# Patient Record
Sex: FEMALE | Race: WHITE | Employment: FULL TIME | URBAN - METROPOLITAN AREA
[De-identification: names, ages, dates, MRNs, and addresses within clinical notes are randomized per-mention and may not be internally consistent; named-entity substitution may affect disease eponyms.]

---

## 2020-01-23 ENCOUNTER — TRANSFERRED RECORDS (OUTPATIENT)
Dept: HEALTH INFORMATION MANAGEMENT | Facility: CLINIC | Age: 50
End: 2020-01-23

## 2020-02-13 ENCOUNTER — TRANSFERRED RECORDS (OUTPATIENT)
Dept: HEALTH INFORMATION MANAGEMENT | Facility: CLINIC | Age: 50
End: 2020-02-13

## 2020-03-16 ENCOUNTER — TRANSFERRED RECORDS (OUTPATIENT)
Dept: HEALTH INFORMATION MANAGEMENT | Facility: CLINIC | Age: 50
End: 2020-03-16

## 2020-11-13 ENCOUNTER — TRANSFERRED RECORDS (OUTPATIENT)
Dept: HEALTH INFORMATION MANAGEMENT | Facility: CLINIC | Age: 50
End: 2020-11-13

## 2020-11-13 ENCOUNTER — MEDICAL CORRESPONDENCE (OUTPATIENT)
Dept: HEALTH INFORMATION MANAGEMENT | Facility: CLINIC | Age: 50
End: 2020-11-13

## 2020-11-20 ENCOUNTER — TRANSFERRED RECORDS (OUTPATIENT)
Dept: HEALTH INFORMATION MANAGEMENT | Facility: CLINIC | Age: 50
End: 2020-11-20

## 2020-11-23 ENCOUNTER — TRANSCRIBE ORDERS (OUTPATIENT)
Dept: OTHER | Age: 50
End: 2020-11-23

## 2020-11-23 DIAGNOSIS — M25.531 RIGHT WRIST PAIN: Primary | ICD-10-CM

## 2020-12-02 ENCOUNTER — OFFICE VISIT (OUTPATIENT)
Dept: ORTHOPEDICS | Facility: CLINIC | Age: 50
End: 2020-12-02
Payer: OTHER MISCELLANEOUS

## 2020-12-02 ENCOUNTER — ANCILLARY PROCEDURE (OUTPATIENT)
Dept: GENERAL RADIOLOGY | Facility: CLINIC | Age: 50
End: 2020-12-02
Attending: STUDENT IN AN ORGANIZED HEALTH CARE EDUCATION/TRAINING PROGRAM
Payer: OTHER MISCELLANEOUS

## 2020-12-02 VITALS
BODY MASS INDEX: 48.82 KG/M2 | SYSTOLIC BLOOD PRESSURE: 146 MMHG | HEIGHT: 65 IN | WEIGHT: 293 LBS | DIASTOLIC BLOOD PRESSURE: 86 MMHG

## 2020-12-02 DIAGNOSIS — M25.531 RIGHT WRIST PAIN: ICD-10-CM

## 2020-12-02 DIAGNOSIS — S63.591A: Primary | ICD-10-CM

## 2020-12-02 PROCEDURE — 99204 OFFICE O/P NEW MOD 45 MIN: CPT | Performed by: STUDENT IN AN ORGANIZED HEALTH CARE EDUCATION/TRAINING PROGRAM

## 2020-12-02 PROCEDURE — 73130 X-RAY EXAM OF HAND: CPT | Mod: RT | Performed by: RADIOLOGY

## 2020-12-02 PROCEDURE — 73110 X-RAY EXAM OF WRIST: CPT | Mod: RT | Performed by: RADIOLOGY

## 2020-12-02 RX ORDER — HYDROCHLOROTHIAZIDE 12.5 MG/1
CAPSULE ORAL DAILY
COMMUNITY

## 2020-12-02 RX ORDER — PRAVASTATIN SODIUM 10 MG
10 TABLET ORAL DAILY
COMMUNITY

## 2020-12-02 RX ORDER — GLIPIZIDE 10 MG/1
10 TABLET ORAL
COMMUNITY

## 2020-12-02 ASSESSMENT — MIFFLIN-ST. JEOR: SCORE: 2048.78

## 2020-12-02 NOTE — LETTER
12/2/2020         RE: Ana Goode  [address] [place] [id number]        Dear Colleague,    Thank you for referring your patient, Ana Goode, to the [facility] [place]. Please see a copy of my visit note below.    Hand Surgery History & Physical    REFERRING PHYSICIAN: Franklin Gay   PRIMARY CARE PHYSICIAN: Franklin Gay           Chief Complaint:   Pain of the Right Wrist      History of Present Illness:  Symptom Profile Including: location of symptoms, onset, severity, exacerbating/alleviating factors, previous treatments:        Ana Goode is a 50 year old female who presents for evaluation of right wrist pain.   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    Numbness/tingling: This is localized to the wrist. It began 2 years ago and she relates it to an injury in 2018 when she was putting 20 pounds of ground beef through a floor mixer and rotating a lever/crank when the lever jammed and caused a significant pop in her right wrist.  The pain is worsened with increased use such as mixing. It is worsened by gripping. It is improved by ice and vibration (for ex when she uses the hand mixer).  It is also worsened with lifting. It wakes the patient from sleep.  Prior treatments included brace and massager these seem to help minimally.    Occasional popping and clicking localized to the dorsal central and ulnar aspects of the wrist.           Past Medical History:     Past Medical History:   Diagnosis Date     Diabetes mellitus (H)             Past Surgical History:     Past Surgical History:   Procedure Laterality Date     HYSTERECTOMY              Social History:     Social History     Tobacco Use     Smoking status: Never Smoker     Smokeless tobacco: Never Used   Substance Use Topics     Alcohol use: Not on file            Family History:     Family History   Problem Relation Age of Onset     Diabetes Maternal Grandmother      Heart Disease Maternal  "Grandmother      Diabetes Paternal Grandmother             Allergies:     Allergies   Allergen Reactions     Codeine             Medications:     Current Outpatient Medications   Medication     glipiZIDE (GLUCOTROL) 10 MG tablet     hydrochlorothiazide (MICROZIDE) 12.5 MG capsule     metFORMIN (GLUCOPHAGE) 1000 MG tablet     pravastatin (PRAVACHOL) 10 MG tablet     No current facility-administered medications for this visit.              Review of Systems:     A 10 point ROS was performed and reviewed. Specific responses to these questions are noted at the end of the document.         Physical Exam:   Vitals: Ht 1.651 m (5' 5\")   Wt 143.8 kg (317 lb)   BMI 52.75 kg/m      Physical Exam Adult:  General: Well-nourished, alert, cooperative with exam and in no acute distress  HEENT: Atraumatic, normocephalic, extraocular movements intact, moist mucous membranes, trachea midline  Pulmonary: Unlabored work of breathing, on room air  Cardiovascular: Warm and well-perfused extremities. 2+ radial pulses  Skin: Warm, intact without rashes to the upper extremities, head or neck   Gait: Normal  Neuro/psych: Oriented to time, place and person. Affect is appropriate    Musculoskeletal: A focused physical examination of the right wrist reveals:   Inspection-there is mild increased prominence of the ulnar head on the right when compared to the left.  Palpation- there is tenderness to palpation over the dorsal aspect of the wrist centrally and over the ulnar head.  There is tenderness at the radiocarpal joint but there is much more tenderness over the distal radial ulnar joint.  Nontender to palpation over the fovea.  Negative ulnar ballottement.  No pain with passive ulnar deviation of the wrist.  Neurovascular- intact light touch sensation and motor to distribution of the median, ulnar and radial nerves. Brisk capillary refill to the distal fingers. 2+ radial pulse.   Range of Motion: Full and symmetric wrist flexion and " extension and pronation and supination.  However repetitive pronation and supination causes discomfort in the wrist.  Able to make a full fist.  Stability: no dislocation, subluxation or laxity.  With the forearm supinated to 90 degrees the distal radial ulnar joint is stable when tested by shucking the ulnar volar and dorsal.  With the forearm pronated to 90 degrees there is increased laxity in the DRUJ compared to the contralateral side.  In addition there is very slight crepitus.  With the forearm fully supinated and resisted forearm flexion this results in pain at the DRUJ.  Muscle strength and tone: No atrophy, spasticity or flaccidness. 5/5 strength EPL, FPL, APB, first dorsal interosseous.    Special Tests:    ECU Synergy test is negative and does not reproduce dorsal or ulnar wrist pain.    Median nerve  Thenar atrophy: None  Carpal tunnel compression test: Negative  Phalen's test: Negative  Tinel's test: Negative    Ulnar nerve  First dorsal interosseous atrophy: None    Tendon: FDS and FDP intact to all fingers. Normal tenodesis effect.     CMC grind: No pain                      Imaging:   3 view X-ray of the right hand and wrist was independently interpreted by me. The results were discussed with the patient.  Findings include:    Hand x-ray: Eaton stage II thumb CMC arthritis with joint space narrowing, subchondral sclerosis and osteophytes less than 2 mm.  Mild joint subluxation.  There is also mild STT arthritis.     Wrist x-ray: 4 mm negative ulnar variance.  Normal radiocarpal joint without evidence of degenerative change.  Mild unusual appearance of the distal radial ulnar joint with slight joint incongruity but no significant joint space narrowing, osteophytes, or subchondral sclerosis to suggest degenerative change.                 Assessment and Plan:   Assessment:  50 year old female with chronic right wrist pain exacerbated by lifting and repetitive pronation and supination, related to an  axial load type of injury 2 years ago.  Signs, symptoms, clinical exam, though not completely clear are suggestive of distal radial ulnar joint instability.    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     Plan:  1. I discussed the patient's diagnosis with her and reviewed all of her imaging and the plan going forward.  2. I recommend obtaining a right wrist MRI to evaluate for incompetence of the DRUJ ligaments.  Specifically I would like the exam to be obtained in pronation to look for any incongruity at the distal radial ulnar joint as well as any ligamentous disruption.    3. The patient will follow up after the MRI is complete to review the results and discuss moving forward.      Mlea Chavez MD  Department of Orthopedic Surgery  Hand Surgery              Again, thank you for allowing me to participate in the care of your patient.        Sincerely,        Mela Chavez MD

## 2020-12-02 NOTE — PATIENT INSTRUCTIONS
1. Right wrist pain      The [facility] team will call you to schedule your imaging exam in the next 1-2 days. You can also call them directly at [facility] [phone] ([address], [address], [place], [place]) to schedule your appointment.     Follow up with Dr. Chavez 1-2 days after MRI    Call my office with any questions or concerns, [phone].

## 2020-12-02 NOTE — PROGRESS NOTES
Hand Surgery History & Physical    REFERRING PHYSICIAN: Franklin Gay   PRIMARY CARE PHYSICIAN: Franklin Gay           Chief Complaint:   Pain of the Right Wrist      History of Present Illness:  Symptom Profile Including: location of symptoms, onset, severity, exacerbating/alleviating factors, previous treatments:        Ana Goode is a 50 year old female who presents for evaluation of right wrist pain.   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    Numbness/tingling: This is localized to the wrist. It began 2 years ago and she relates it to an injury in 2018 when she was putting 20 pounds of ground beef through a floor mixer and rotating a lever/crank when the lever jammed and caused a significant pop in her right wrist.  The pain is worsened with increased use such as mixing. It is worsened by gripping. It is improved by ice and vibration (for ex when she uses the hand mixer).  It is also worsened with lifting. It wakes the patient from sleep.  Prior treatments included brace and massager these seem to help minimally.    Occasional popping and clicking localized to the dorsal central and ulnar aspects of the wrist.           Past Medical History:     Past Medical History:   Diagnosis Date     Diabetes mellitus (H)             Past Surgical History:     Past Surgical History:   Procedure Laterality Date     HYSTERECTOMY              Social History:     Social History     Tobacco Use     Smoking status: Never Smoker     Smokeless tobacco: Never Used   Substance Use Topics     Alcohol use: Not on file            Family History:     Family History   Problem Relation Age of Onset     Diabetes Maternal Grandmother      Heart Disease Maternal Grandmother      Diabetes Paternal Grandmother             Allergies:     Allergies   Allergen Reactions     Codeine             Medications:     Current Outpatient Medications   Medication     glipiZIDE (GLUCOTROL) 10 MG tablet     hydrochlorothiazide (MICROZIDE)  "12.5 MG capsule     metFORMIN (GLUCOPHAGE) 1000 MG tablet     pravastatin (PRAVACHOL) 10 MG tablet     No current facility-administered medications for this visit.              Review of Systems:     A 10 point ROS was performed and reviewed. Specific responses to these questions are noted at the end of the document.         Physical Exam:   Vitals: Ht 1.651 m (5' 5\")   Wt 143.8 kg (317 lb)   BMI 52.75 kg/m      Physical Exam Adult:  General: Well-nourished, alert, cooperative with exam and in no acute distress  HEENT: Atraumatic, normocephalic, extraocular movements intact, moist mucous membranes, trachea midline  Pulmonary: Unlabored work of breathing, on room air  Cardiovascular: Warm and well-perfused extremities. 2+ radial pulses  Skin: Warm, intact without rashes to the upper extremities, head or neck   Gait: Normal  Neuro/psych: Oriented to time, place and person. Affect is appropriate    Musculoskeletal: A focused physical examination of the right wrist reveals:   Inspection-there is mild increased prominence of the ulnar head on the right when compared to the left.  Palpation- there is tenderness to palpation over the dorsal aspect of the wrist centrally and over the ulnar head.  There is tenderness at the radiocarpal joint but there is much more tenderness over the distal radial ulnar joint.  Nontender to palpation over the fovea.  Negative ulnar ballottement.  No pain with passive ulnar deviation of the wrist.  Neurovascular- intact light touch sensation and motor to distribution of the median, ulnar and radial nerves. Brisk capillary refill to the distal fingers. 2+ radial pulse.   Range of Motion: Full and symmetric wrist flexion and extension and pronation and supination.  However repetitive pronation and supination causes discomfort in the wrist.  Able to make a full fist.  Stability: no dislocation, subluxation or laxity.  With the forearm supinated to 90 degrees the distal radial ulnar joint is " stable when tested by shucking the ulnar volar and dorsal.  With the forearm pronated to 90 degrees there is increased laxity in the DRUJ compared to the contralateral side.  In addition there is very slight crepitus.  With the forearm fully supinated and resisted forearm flexion this results in pain at the DRUJ.  Muscle strength and tone: No atrophy, spasticity or flaccidness. 5/5 strength EPL, FPL, APB, first dorsal interosseous.    Special Tests:    ECU Synergy test is negative and does not reproduce dorsal or ulnar wrist pain.    Median nerve  Thenar atrophy: None  Carpal tunnel compression test: Negative  Phalen's test: Negative  Tinel's test: Negative    Ulnar nerve  First dorsal interosseous atrophy: None    Tendon: FDS and FDP intact to all fingers. Normal tenodesis effect.     CMC grind: No pain                      Imaging:   3 view X-ray of the right hand and wrist was independently interpreted by me. The results were discussed with the patient.  Findings include:    Hand x-ray: Eaton stage II thumb CMC arthritis with joint space narrowing, subchondral sclerosis and osteophytes less than 2 mm.  Mild joint subluxation.  There is also mild STT arthritis.     Wrist x-ray: 4 mm negative ulnar variance.  Normal radiocarpal joint without evidence of degenerative change.  Mild unusual appearance of the distal radial ulnar joint with slight joint incongruity but no significant joint space narrowing, osteophytes, or subchondral sclerosis to suggest degenerative change.                 Assessment and Plan:   Assessment:  50 year old female with chronic right wrist pain exacerbated by lifting and repetitive pronation and supination, related to an axial load type of injury 2 years ago.  Signs, symptoms, clinical exam, though not completely clear are suggestive of distal radial ulnar joint instability.    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     Plan:  1. I  discussed the patient's diagnosis with her and reviewed all of her imaging and the plan going forward.  2. I recommend obtaining a right wrist MRI to evaluate for incompetence of the DRUJ ligaments.  Specifically I would like the exam to be obtained in pronation to look for any incongruity at the distal radial ulnar joint as well as any ligamentous disruption.    3. The patient will follow up after the MRI is complete to review the results and discuss moving forward.      Mela Chavez MD  Department of Orthopedic Surgery  Hand Surgery

## 2020-12-10 ENCOUNTER — HOSPITAL ENCOUNTER (OUTPATIENT)
Dept: MRI IMAGING | Facility: CLINIC | Age: 50
Discharge: HOME | End: 2020-12-10
Attending: STUDENT IN AN ORGANIZED HEALTH CARE EDUCATION/TRAINING PROGRAM | Admitting: STUDENT IN AN ORGANIZED HEALTH CARE EDUCATION/TRAINING PROGRAM
Payer: OTHER MISCELLANEOUS

## 2020-12-10 DIAGNOSIS — M25.531 RIGHT WRIST PAIN: ICD-10-CM

## 2020-12-10 PROCEDURE — 73221 MRI JOINT UPR EXTREM W/O DYE: CPT | Mod: RT

## 2020-12-10 PROCEDURE — 73221 MRI JOINT UPR EXTREM W/O DYE: CPT | Mod: 26 | Performed by: RADIOLOGY

## 2020-12-16 ENCOUNTER — TELEPHONE (OUTPATIENT)
Dept: ORTHOPEDICS | Facility: CLINIC | Age: 50
End: 2020-12-16

## 2020-12-16 NOTE — TELEPHONE ENCOUNTER
Called and got the patient on the schedule. She is coming from [place] and will call if she needs to change the appointment for any reason.    Connie Shaikh ATC

## 2020-12-16 NOTE — TELEPHONE ENCOUNTER
Message from Ana, returning call regarding MRI results and making follow up appointment.  Please call back.

## 2020-12-30 ENCOUNTER — OFFICE VISIT (OUTPATIENT)
Dept: ORTHOPEDICS | Facility: CLINIC | Age: 50
End: 2020-12-30
Payer: OTHER MISCELLANEOUS

## 2020-12-30 VITALS — BODY MASS INDEX: 48.82 KG/M2 | WEIGHT: 293 LBS | HEIGHT: 65 IN

## 2020-12-30 DIAGNOSIS — M25.531 RIGHT WRIST PAIN: Primary | ICD-10-CM

## 2020-12-30 PROCEDURE — 20605 DRAIN/INJ JOINT/BURSA W/O US: CPT | Mod: RT | Performed by: STUDENT IN AN ORGANIZED HEALTH CARE EDUCATION/TRAINING PROGRAM

## 2020-12-30 PROCEDURE — 99213 OFFICE O/P EST LOW 20 MIN: CPT | Mod: 25 | Performed by: STUDENT IN AN ORGANIZED HEALTH CARE EDUCATION/TRAINING PROGRAM

## 2020-12-30 RX ADMIN — LIDOCAINE HYDROCHLORIDE 1 ML: 10 INJECTION, SOLUTION INFILTRATION; PERINEURAL at 14:49

## 2020-12-30 RX ADMIN — TESTOSTERONE CYPIONATE 1 ML: 200 INJECTION INTRAMUSCULAR at 14:49

## 2020-12-30 ASSESSMENT — MIFFLIN-ST. JEOR: SCORE: 2048.78

## 2020-12-30 NOTE — PROGRESS NOTES
Hand Surgery History & Physical    REFERRING PHYSICIAN: Franklin Gay   PRIMARY CARE PHYSICIAN: Franklin Gay           Chief Complaint:   Follow Up of the Right Wrist      History of Present Illness:  Symptom Profile Including: location of symptoms, onset, severity, exacerbating/alleviating factors, previous treatments:        Ana Goode is a 50 year old female who presents for evaluation of right wrist pain.   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     Following up today to go over MRI results. States that her symptoms are getting a little worse but did say she she just finished 9 straight days of work.  Briefly, her right wrist pain seems to originate after a jamming type of injury when using a lever a few years ago.  She continues to have pain in the dorsal aspect of the wrist near the DRUJ.         Past Medical History:     Past Medical History:   Diagnosis Date     Diabetes mellitus (H)             Past Surgical History:     Past Surgical History:   Procedure Laterality Date     HYSTERECTOMY              Social History:     Social History     Tobacco Use     Smoking status: Never Smoker     Smokeless tobacco: Never Used   Substance Use Topics     Alcohol use: Not on file            Family History:     Family History   Problem Relation Age of Onset     Diabetes Maternal Grandmother      Heart Disease Maternal Grandmother      Diabetes Paternal Grandmother             Allergies:     Allergies   Allergen Reactions     Codeine             Medications:     Current Outpatient Medications   Medication     glipiZIDE (GLUCOTROL) 10 MG tablet     hydrochlorothiazide (MICROZIDE) 12.5 MG capsule     metFORMIN (GLUCOPHAGE) 1000 MG tablet     pravastatin (PRAVACHOL) 10 MG tablet     No current facility-administered medications for this visit.              Review of Systems:     A 10 point ROS was performed and reviewed. Specific responses to these questions are noted at the end of the document.          "Physical Exam:   Vitals: Ht 1.651 m (5' 5\")   Wt 143.8 kg (317 lb)   BMI 52.75 kg/m      Physical Exam Adult:  General: Well-nourished, alert, cooperative with exam and in no acute distress  HEENT: Atraumatic, normocephalic, extraocular movements intact, moist mucous membranes, trachea midline  Pulmonary: Unlabored work of breathing, on room air  Cardiovascular: Warm and well-perfused extremities. 2+ radial pulses  Skin: Warm, intact without rashes to the upper extremities, head or neck   Gait: Normal  Neuro/psych: Oriented to time, place and person. Affect is appropriate    Musculoskeletal: A focused physical examination of the right wrist reveals:   Inspection-there is mild increased prominence of the ulnar head on the right when compared to the left.  Palpation- there is tenderness to palpation over the dorsal aspect of the wrist centrally and over the ulnar head.  There is tenderness at the radiocarpal joint but there is much more tenderness over the distal radial ulnar joint.  Nontender to palpation over the fovea.  Negative ulnar ballottement.  No pain with passive ulnar deviation of the wrist.  Neurovascular- intact light touch sensation and motor to distribution of the median, ulnar and radial nerves. Brisk capillary refill to the distal fingers. 2+ radial pulse.   Range of Motion: Full and symmetric wrist flexion and extension and pronation and supination.  However repetitive pronation and supination causes discomfort in the wrist.  Able to make a full fist.  Stability: no dislocation, subluxation or laxity.  There is crepitus in the DRUJ.  With the forearm fully supinated and resisted forearm flexion this results in pain at the DRUJ.  Muscle strength and tone: No atrophy, spasticity or flaccidness. 5/5 strength EPL, FPL, APB, first dorsal interosseous.    Special Tests:    ECU Synergy test is negative and does not reproduce dorsal or ulnar wrist pain.    Median nerve  Thenar atrophy: None  Carpal tunnel " compression test: Negative  Phalen's test: Negative  Tinel's test: Negative    Ulnar nerve  First dorsal interosseous atrophy: None    Tendon: FDS and FDP intact to all fingers. Normal tenodesis effect.     CMC grind: No pain                      Imaging:   Right wrist MRI report is reviewed and images are also independently interpreted.    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  The volar and dorsal ligaments of the DRUJ are intact.  There is no evidence of ulnar subluxation.             Assessment and Plan:   Assessment:  50 year old female with chronic right wrist pain exacerbated by lifting and repetitive pronation and supination, related to an axial load type of injury 2 years ago.  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     Plan:  1. I discussed the patient's MRI with her and counseled her that my next recommendation would be attempting to perform a corticosteroid injection in the right, distal radial ulnar joint.  This was administered in clinic today.  See procedure note below.  2. I have also ordered inflammatory lab as work-up for inflammatory arthropathy secondary to rheumatoid arthritis, lupus, gout.  3. I will call the patient regarding her lab results after they are complete.  At that time I will also see how she is feeling and if the steroid injection was beneficial to her.    4. Procedure: Right, distal radial ulnar joint injection.  The right DRUJ was palpated just proximal to the radiocarpal joint.  I palpated the point of maximum tenderness.  I used a ChloraPrep solution to prep the wrist.  I inserted a 27-gauge needle down to the bone tendon to directed into the DRUJ.  I injected 1 cc of dexamethasone combined with 1 cc of 1% lidocaine.  I withdrew the needle and applied a  sterile Band-Aid.  Patient tolerated procedure without any immediate complication.      Mela Chavez MD  Department of Orthopedic Surgery  Hand Surgery    Medium Joint Injection/Arthrocentesis    Date/Time: 12/30/2020 2:49 PM  Performed by: Mela Chavez MD  Authorized by: Mela Chavez MD     Needle Size comment:  27 g  Location:  Wrist  Medications:  1 mL lidocaine 1 %; 1 mL dexamethasone 120 MG/30ML  Outcome:  Tolerated well, no immediate complications  Procedure discussed: discussed risks, benefits, and alternatives    Timeout: timeout called immediately prior to procedure    Prep: patient was prepped and draped in usual sterile fashion

## 2020-12-30 NOTE — PATIENT INSTRUCTIONS
1. Right wrist pain        Lab orders have been placed. Please call and make an appointment at your earliest convenience. You can do this at any of the [facility] that have a lab on site. If you would like to have them done at the [facility] you can call [phone].    Steroid injection of the right wrist was performed today in clinic    - Would not soak in a hot tub, bath or swimming pool for 48 hours  - Ok to shower  - Ice today and only do your normal amounts of activity  - The lidocaine (what is giving you pain relief right now) will likely stop working in 1-2 hours.  You will then have pain again, similar to before you received the injection. The corticosteroid will not start working until approximately 1-2 weeks from now.  In a small percentage of people, cortisone can cause flushing/redness in the face. This usually lasts for 1-3 days and resolves. Cool compress and Ibuprofen/Tylenol can help if this happens.         Follow up with Dr. Chavez in 4 weeks    Call my office with any questions or concerns, [phone].

## 2020-12-30 NOTE — LETTER
12/30/2020         RE: Ana Goode  [address] [place] [id number]        Dear Colleague,    Thank you for referring your patient, Ana Goode, to the [facility] [place]. Please see a copy of my visit note below.    Hand Surgery History & Physical    REFERRING PHYSICIAN: Franklin Gay   PRIMARY CARE PHYSICIAN: Franklin Gay           Chief Complaint:   Follow Up of the Right Wrist      History of Present Illness:  Symptom Profile Including: location of symptoms, onset, severity, exacerbating/alleviating factors, previous treatments:        Ana Goode is a 50 year old female who presents for evaluation of right wrist pain.   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     Following up today to go over MRI results. States that her symptoms are getting a little worse but did say she she just finished 9 straight days of work.  Briefly, her right wrist pain seems to originate after a jamming type of injury when using a lever a few years ago.  She continues to have pain in the dorsal aspect of the wrist near the DRUJ.         Past Medical History:     Past Medical History:   Diagnosis Date     Diabetes mellitus (H)             Past Surgical History:     Past Surgical History:   Procedure Laterality Date     HYSTERECTOMY              Social History:     Social History     Tobacco Use     Smoking status: Never Smoker     Smokeless tobacco: Never Used   Substance Use Topics     Alcohol use: Not on file            Family History:     Family History   Problem Relation Age of Onset     Diabetes Maternal Grandmother      Heart Disease Maternal Grandmother      Diabetes Paternal Grandmother             Allergies:     Allergies   Allergen Reactions     Codeine             Medications:     Current Outpatient Medications   Medication     glipiZIDE (GLUCOTROL) 10 MG tablet     hydrochlorothiazide (MICROZIDE) 12.5 MG capsule     metFORMIN (GLUCOPHAGE) 1000 MG tablet     pravastatin  "(PRAVACHOL) 10 MG tablet     No current facility-administered medications for this visit.              Review of Systems:     A 10 point ROS was performed and reviewed. Specific responses to these questions are noted at the end of the document.         Physical Exam:   Vitals: Ht 1.651 m (5' 5\")   Wt 143.8 kg (317 lb)   BMI 52.75 kg/m      Physical Exam Adult:  General: Well-nourished, alert, cooperative with exam and in no acute distress  HEENT: Atraumatic, normocephalic, extraocular movements intact, moist mucous membranes, trachea midline  Pulmonary: Unlabored work of breathing, on room air  Cardiovascular: Warm and well-perfused extremities. 2+ radial pulses  Skin: Warm, intact without rashes to the upper extremities, head or neck   Gait: Normal  Neuro/psych: Oriented to time, place and person. Affect is appropriate    Musculoskeletal: A focused physical examination of the right wrist reveals:   Inspection-there is mild increased prominence of the ulnar head on the right when compared to the left.  Palpation- there is tenderness to palpation over the dorsal aspect of the wrist centrally and over the ulnar head.  There is tenderness at the radiocarpal joint but there is much more tenderness over the distal radial ulnar joint.  Nontender to palpation over the fovea.  Negative ulnar ballottement.  No pain with passive ulnar deviation of the wrist.  Neurovascular- intact light touch sensation and motor to distribution of the median, ulnar and radial nerves. Brisk capillary refill to the distal fingers. 2+ radial pulse.   Range of Motion: Full and symmetric wrist flexion and extension and pronation and supination.  However repetitive pronation and supination causes discomfort in the wrist.  Able to make a full fist.  Stability: no dislocation, subluxation or laxity.  There is crepitus in the DRUJ.  With the forearm fully supinated and resisted forearm flexion this results in pain at the DRUJ.  Muscle strength and " tone: No atrophy, spasticity or flaccidness. 5/5 strength EPL, FPL, APB, first dorsal interosseous.    Special Tests:    ECU Synergy test is negative and does not reproduce dorsal or ulnar wrist pain.    Median nerve  Thenar atrophy: None  Carpal tunnel compression test: Negative  Phalen's test: Negative  Tinel's test: Negative    Ulnar nerve  First dorsal interosseous atrophy: None    Tendon: FDS and FDP intact to all fingers. Normal tenodesis effect.     CMC grind: No pain                      Imaging:   Right wrist MRI report is reviewed and images are also independently interpreted.    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  The volar and dorsal ligaments of the DRUJ are intact.  There is no evidence of ulnar subluxation.             Assessment and Plan:   Assessment:  50 year old female with chronic right wrist pain exacerbated by lifting and repetitive pronation and supination, related to an axial load type of injury 2 years ago.  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     Plan:  1. I discussed the patient's MRI with her and counseled her that my next recommendation would be attempting to perform a corticosteroid injection in the right, distal radial ulnar joint.  This was administered in clinic today.  See procedure note below.  2. I have also ordered inflammatory lab as work-up for inflammatory arthropathy secondary to rheumatoid arthritis, lupus, gout.  3. I will call the patient regarding her lab results after they are complete.  At that time I will also see how she is feeling and if the steroid injection was beneficial to her.    4. Procedure: Right, distal radial ulnar joint injection.  The right DRUJ was palpated just proximal to the radiocarpal joint.  I palpated the point of maximum  tenderness.  I used a ChloraPrep solution to prep the wrist.  I inserted a 27-gauge needle down to the bone tendon to directed into the DRUJ.  I injected 1 cc of dexamethasone combined with 1 cc of 1% lidocaine.  I withdrew the needle and applied a sterile Band-Aid.  Patient tolerated procedure without any immediate complication.      Mela Chavez MD  Department of Orthopedic Surgery  Hand Surgery    Medium Joint Injection/Arthrocentesis    Date/Time: 12/30/2020 2:49 PM  Performed by: Mela Chavez MD  Authorized by: Mela Chavez MD     Needle Size comment:  27 g  Location:  Wrist  Medications:  1 mL lidocaine 1 %; 1 mL dexamethasone 120 MG/30ML  Outcome:  Tolerated well, no immediate complications  Procedure discussed: discussed risks, benefits, and alternatives    Timeout: timeout called immediately prior to procedure    Prep: patient was prepped and draped in usual sterile fashion                    Again, thank you for allowing me to participate in the care of your patient.        Sincerely,        Mela Chavez MD

## 2021-01-01 RX ORDER — TESTOSTERONE CYPIONATE 200 MG/ML
1 INJECTION INTRAMUSCULAR
Status: SHIPPED | OUTPATIENT
Start: 2020-12-30

## 2021-01-01 RX ORDER — LIDOCAINE HYDROCHLORIDE 10 MG/ML
1 INJECTION, SOLUTION INFILTRATION; PERINEURAL
Status: SHIPPED | OUTPATIENT
Start: 2020-12-30